# Patient Record
Sex: MALE | Race: WHITE | Employment: FULL TIME | URBAN - METROPOLITAN AREA
[De-identification: names, ages, dates, MRNs, and addresses within clinical notes are randomized per-mention and may not be internally consistent; named-entity substitution may affect disease eponyms.]

---

## 2018-12-29 ENCOUNTER — OFFICE VISIT (OUTPATIENT)
Dept: FAMILY MEDICINE CLINIC | Age: 48
End: 2018-12-29
Payer: COMMERCIAL

## 2018-12-29 VITALS
SYSTOLIC BLOOD PRESSURE: 132 MMHG | HEIGHT: 67 IN | TEMPERATURE: 98.5 F | WEIGHT: 179 LBS | OXYGEN SATURATION: 98 % | RESPIRATION RATE: 18 BRPM | DIASTOLIC BLOOD PRESSURE: 78 MMHG | HEART RATE: 76 BPM | BODY MASS INDEX: 28.09 KG/M2

## 2018-12-29 DIAGNOSIS — J06.9 VIRAL URI: Primary | ICD-10-CM

## 2018-12-29 PROCEDURE — 99202 OFFICE O/P NEW SF 15 MIN: CPT | Performed by: NURSE PRACTITIONER

## 2018-12-29 RX ORDER — PREDNISONE 20 MG/1
40 TABLET ORAL DAILY
Qty: 10 TABLET | Refills: 0 | Status: SHIPPED | OUTPATIENT
Start: 2018-12-29 | End: 2019-01-03

## 2018-12-29 RX ORDER — SIMVASTATIN 20 MG
TABLET ORAL
COMMUNITY
Start: 2018-12-23

## 2018-12-29 RX ORDER — BROMPHENIRAMINE MALEATE, PSEUDOEPHEDRINE HYDROCHLORIDE, AND DEXTROMETHORPHAN HYDROBROMIDE 2; 30; 10 MG/5ML; MG/5ML; MG/5ML
5 SYRUP ORAL 3 TIMES DAILY PRN
Qty: 90 ML | Refills: 0 | Status: SHIPPED | OUTPATIENT
Start: 2018-12-29

## 2018-12-29 NOTE — PROGRESS NOTES
[address] [address]  [place] [address]  Dept: [phone]  Dept Fax: [phone]    Alden Rossi is a 46 y.o. male who presents to the urgent care today for his medicalconditions/complaints as noted below. Alden Rossi is c/o of Sinusitis (symptoms on and off for a few months but this time just cant shake it ); Chest Congestion; and Cough      HPI:         46 y.o.: male presents to the clinic with c/o cough which has been intermittent over the past week. Cough is described as harsh, non-productive. Discussed sore throat, and post nasal drip. Onset of sx is one week. Describes pain as mild scratchy throat rated 2/10. Associated sx include sore throat, post nasal drip. Denies fevers, chills, chest pain, shortness of breath, abd pain. Denies ear pain, sinus pain. Treatments tried include flonase, mucinex, otc cough/ cold congestion meds. Pt has previously been on two rounds of abx recently. Worsening factors include none. Alleviating factors include none. Past Medical History:   Diagnosis Date    Hyperlipidemia         Current Outpatient Prescriptions   Medication Sig Dispense Refill    predniSONE (DELTASONE) 20 MG tablet Take 2 tablets by mouth daily for 5 days 10 tablet 0    brompheniramine-pseudoephedrine-DM 30-2-10 MG/5ML syrup Take 5 mLs by mouth 3 times daily as needed for Congestion or Cough 90 mL 0    simvastatin (ZOCOR) 20 MG tablet        No current facility-administered medications for this visit. Allergies   Allergen Reactions    Sulfa Antibiotics Hives       Subjective:      Review of Systems   Constitutional: Negative for chills and fever. HENT: Positive for postnasal drip. Negative for ear pain, sinus pressure and sore throat. Respiratory: Positive for cough. Negative for shortness of breath and wheezing. Cardiovascular: Negative for chest pain and palpitations.    All other systems reviewed and are negative. Objective:     Physical Exam   Constitutional: He is oriented to person, place, and time. He appears well-developed and well-nourished. HENT:   Head: Atraumatic. Right Ear: Tympanic membrane normal.   Left Ear: Tympanic membrane normal.   Nose: No mucosal edema or rhinorrhea. Right sinus exhibits no maxillary sinus tenderness and no frontal sinus tenderness. Left sinus exhibits no maxillary sinus tenderness and no frontal sinus tenderness. Mouth/Throat: Uvula is midline and oropharynx is clear and moist.   Cardiovascular: Normal rate. Pulmonary/Chest: Effort normal. He has wheezes. Musculoskeletal: He exhibits no deformity. Neurological: He is alert and oriented to person, place, and time. Skin: Skin is warm and dry. Psychiatric: He has a normal mood and affect. His behavior is normal.   Nursing note and vitals reviewed. /78 (Site: Left Upper Arm, Position: Sitting, Cuff Size: Medium Adult)   Pulse 76   Temp 98.5 °F (36.9 °C) (Tympanic)   Resp 18   Ht 5' 7\" (1.702 m)   Wt 179 lb (81.2 kg)   SpO2 98%   BMI 28.04 kg/m²     Assessment:       Diagnosis Orders   1. Viral URI  predniSONE (DELTASONE) 20 MG tablet    brompheniramine-pseudoephedrine-DM 30-2-10 MG/5ML syrup       Plan:      Return if symptoms worsen or fail to improve. Orders Placed This Encounter   Medications    predniSONE (DELTASONE) 20 MG tablet     Sig: Take 2 tablets by mouth daily for 5 days     Dispense:  10 tablet     Refill:  0    brompheniramine-pseudoephedrine-DM 30-2-10 MG/5ML syrup     Sig: Take 5 mLs by mouth 3 times daily as needed for Congestion or Cough     Dispense:  90 mL     Refill:  0         Discussed will use po steroids and bromfed for symptomatic relief. I believe that this is likely a viral illness based on the physical exam findings. Tylenol/Motrin for fever/discomfort. Patient agreeable to treatment plan.   Educational materials provided on AVS.  Follow up if symptoms do

## 2018-12-29 NOTE — PATIENT INSTRUCTIONS
mucus from your lungs by breathing deeply and coughing. · Gargle with warm salt water once an hour. This can help reduce swelling and throat pain. Use 1 teaspoon of salt mixed in 1 cup of warm water. · Do not smoke or allow others to smoke around you. If you need help quitting, talk to your doctor about stop-smoking programs and medicines. These can increase your chances of quitting for good. To avoid spreading the virus  · Cough or sneeze into a tissue. Then throw the tissue away. · If you don't have a tissue, use your hand to cover your cough or sneeze. Then clean your hand. You can also cough into your sleeve. · Wash your hands often. Use soap and warm water. Wash for 15 to 20 seconds each time. · If you don't have soap and water near you, you can clean your hands with alcohol wipes or gel. When should you call for help? Call your doctor now or seek immediate medical care if:    · You have a new or higher fever.     · Your fever lasts more than 48 hours.     · You have trouble breathing.     · You have a fever with a stiff neck or a severe headache.     · You are sensitive to light.     · You feel very sleepy or confused.    Watch closely for changes in your health, and be sure to contact your doctor if:    · You do not get better as expected. Where can you learn more? Go to https://[email or web address]. org and sign in to your [employer] account. Enter E118 in the [facility] box to learn more about \"Viral Respiratory Infection: Care Instructions. \"     If you do not have an account, please click on the \"Sign Up Now\" link. Current as of: December 6, 2017  Content Version: 11.8  © 6604-7316 [employer]. Care instructions adapted under license by [facility] ([facility]). If you have questions about a medical condition or this instruction, always ask your healthcare professional. [address] any warranty or liability for your use of this information.

## 2018-12-30 ASSESSMENT — ENCOUNTER SYMPTOMS
SORE THROAT: 0
SHORTNESS OF BREATH: 0
WHEEZING: 0
COUGH: 1
SINUS PRESSURE: 0